# Patient Record
Sex: FEMALE | Race: WHITE | NOT HISPANIC OR LATINO | Employment: UNEMPLOYED | URBAN - METROPOLITAN AREA
[De-identification: names, ages, dates, MRNs, and addresses within clinical notes are randomized per-mention and may not be internally consistent; named-entity substitution may affect disease eponyms.]

---

## 2024-04-30 ENCOUNTER — TELEPHONE (OUTPATIENT)
Dept: PSYCHIATRY | Facility: CLINIC | Age: 24
End: 2024-04-30
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
## 2024-05-02 ENCOUNTER — TELEPHONE (OUTPATIENT)
Dept: PSYCHIATRY | Facility: CLINIC | Age: 24
End: 2024-05-02

## 2024-05-02 NOTE — TELEPHONE ENCOUNTER
Pt called asking if we could get her in sooner than her scheduled appt. The doc she was seeing was fully booked for the week so I told her we unfortunately couldn't see her sooner.

## 2024-05-03 NOTE — BH TREATMENT PLAN
TREATMENT PLAN (Medication Management Only)        [facility]    Name and Date of Birth:  Barbara Fletcher 23 y.o. 2000  Date of Treatment Plan: 05/08/2024  Diagnosis/Diagnoses:  No diagnosis found.  Strengths/Personal Resources for Self-Care: ability to adapt to life changes, ability to communicate needs, ability to listen, ability to reason, average or above intelligence.  Area/Areas of need (in own words): anxiety symptoms, depressive symptoms  1. Long Term Goal: decrease depression.  Target Date:6 months - 11/8/2024  Person/Persons responsible for completion of goal: Barbara  2.  Short Term Objective (s) - How will we reach this goal?:   A. Provider new recommended medication/dosage changes and/or continue medication(s):  initiate medication management .  B.  Attend follow up appointments  as scheduled .  C.  Take medications as prescribed  .  Target Date:6 months - 11/8/2024  Person/Persons Responsible for Completion of Goal: Barbara  Progress Towards Goals: initiating treatment  Treatment Modality: medication management every 4 weeks  Review due 180 days from date of this plan: 6 months - 11/8/2024  Expected length of service: ongoing treatment  My Physician/PA/NP and I have developed this plan together and I agree to work on the goals and objectives. I understand the treatment goals that were developed for my treatment.

## 2024-05-03 NOTE — BH CRISIS PLAN
Client Name: Barbara Fletchre       Client YOB: 2000    Caitlyn Safety Plan         Step 1: Warning Signs:            Step 2: Internal Coping Strategies:            Step 3: People and social settings that provide distraction:            Step 4: People whom I can ask for help during a crisis:      Step 5: Professionals or agencies I can contact during a crisis:        Crisis Phone Numbers:   Suicide Prevention Lifeline: Call or Text  287 Crisis Text Line: Text HOME to 000-078   Please note: Some [facility] do not have a separate number for Child/Adolescent specific crisis. If your county is not listed under Child/Adolescent, please call the adult number for your county      Adult Crisis Numbers: Child/Adolescent Crisis Numbers   [facility]: [phone] [facility]: [phone]   [facility]: [phone] [facility]: [phone]   [facility]: [phone] [place]: [phone]   [facility]: [phone] [place]: [phone]   [facility]: [phone]   [facility]: [phone]   [facility]: [phone]   [facility]: [phone] (daytime) [phone] (after hours, weekends, holidays)      Step 6: Making the environment safer (plan for lethal means safety):      Optional: What is most important to me and worth living for?      Caitlyn Safety Plan. Tracy Romeo and Renato Sidhu. Used with permission of the authors.

## 2024-05-08 ENCOUNTER — OFFICE VISIT (OUTPATIENT)
Dept: PSYCHIATRY | Facility: CLINIC | Age: 24
End: 2024-05-08
Payer: COMMERCIAL

## 2024-05-08 DIAGNOSIS — F41.1 GENERALIZED ANXIETY DISORDER: ICD-10-CM

## 2024-05-08 DIAGNOSIS — F39 MOOD DISORDER (HCC): ICD-10-CM

## 2024-05-08 DIAGNOSIS — F33.1 MODERATE EPISODE OF RECURRENT MAJOR DEPRESSIVE DISORDER (HCC): Primary | ICD-10-CM

## 2024-05-08 PROBLEM — F32.9 MDD (MAJOR DEPRESSIVE DISORDER): Status: ACTIVE | Noted: 2024-05-08

## 2024-05-08 PROCEDURE — 90792 PSYCH DIAG EVAL W/MED SRVCS: CPT

## 2024-05-08 RX ORDER — SERTRALINE HYDROCHLORIDE 25 MG/1
25 TABLET, FILM COATED ORAL DAILY
Qty: 30 TABLET | Refills: 0 | Status: SHIPPED | OUTPATIENT
Start: 2024-05-08

## 2024-05-08 RX ORDER — ONDANSETRON 4 MG/1
4 TABLET, FILM COATED ORAL EVERY 8 HOURS PRN
COMMUNITY

## 2024-05-08 RX ORDER — LORAZEPAM 0.5 MG/1
0.5 TABLET ORAL EVERY 8 HOURS PRN
COMMUNITY
End: 2024-05-08 | Stop reason: ALTCHOICE

## 2024-06-04 DIAGNOSIS — F33.1 MODERATE EPISODE OF RECURRENT MAJOR DEPRESSIVE DISORDER (HCC): ICD-10-CM

## 2024-06-04 DIAGNOSIS — F41.1 GENERALIZED ANXIETY DISORDER: ICD-10-CM

## 2024-06-04 RX ORDER — SERTRALINE HYDROCHLORIDE 25 MG/1
25 TABLET, FILM COATED ORAL DAILY
Qty: 30 TABLET | Refills: 0 | Status: SHIPPED | OUTPATIENT
Start: 2024-06-04

## 2024-06-05 ENCOUNTER — TELEPHONE (OUTPATIENT)
Dept: PSYCHIATRY | Facility: CLINIC | Age: 24
End: 2024-06-05

## 2024-06-05 NOTE — TELEPHONE ENCOUNTER
Patient is calling regarding cancelling an appointment.    Date/Time: 6/10/24    Reason: none given    Patient was rescheduled: YES [x] NO []  If yes, when was Patient reschedule for: 6/13/24
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
## 2024-06-13 ENCOUNTER — TELEMEDICINE (OUTPATIENT)
Dept: PSYCHIATRY | Facility: CLINIC | Age: 24
End: 2024-06-13
Payer: COMMERCIAL

## 2024-06-13 ENCOUNTER — TELEPHONE (OUTPATIENT)
Dept: PSYCHIATRY | Facility: CLINIC | Age: 24
End: 2024-06-13

## 2024-06-13 DIAGNOSIS — F41.1 GAD (GENERALIZED ANXIETY DISORDER): Primary | ICD-10-CM

## 2024-06-13 DIAGNOSIS — F32.9 MAJOR DEPRESSIVE DISORDER WITH CURRENT ACTIVE EPISODE, UNSPECIFIED DEPRESSION EPISODE SEVERITY, UNSPECIFIED WHETHER RECURRENT: ICD-10-CM

## 2024-06-13 PROBLEM — R19.7 DIARRHEA: Status: ACTIVE | Noted: 2024-06-13

## 2024-06-13 PROBLEM — K21.9 GASTROESOPHAGEAL REFLUX DISEASE: Status: ACTIVE | Noted: 2024-02-07

## 2024-06-13 PROBLEM — K58.1 IRRITABLE BOWEL SYNDROME WITH CONSTIPATION: Status: ACTIVE | Noted: 2024-02-07

## 2024-06-13 PROBLEM — F32.A DEPRESSION: Status: ACTIVE | Noted: 2024-05-08

## 2024-06-13 PROBLEM — F31.76: Status: ACTIVE | Noted: 2019-12-19

## 2024-06-13 PROBLEM — R00.0 SINUS TACHYCARDIA: Status: ACTIVE | Noted: 2024-05-14

## 2024-06-13 PROBLEM — R00.2 PALPITATIONS: Status: ACTIVE | Noted: 2023-03-20

## 2024-06-13 PROBLEM — Z71.9 ENCOUNTER FOR EDUCATION: Status: ACTIVE | Noted: 2024-06-13

## 2024-06-13 PROBLEM — R19.4 CHANGE IN BOWEL HABIT: Status: ACTIVE | Noted: 2024-06-13

## 2024-06-13 PROBLEM — K31.84 GASTROPARESIS: Status: ACTIVE | Noted: 2024-02-07

## 2024-06-13 PROBLEM — R10.13 EPIGASTRIC PAIN: Status: ACTIVE | Noted: 2024-06-13

## 2024-06-13 PROBLEM — U07.1 COVID-19: Status: ACTIVE | Noted: 2024-06-13

## 2024-06-13 PROBLEM — I31.39 PERICARDIAL EFFUSION: Status: ACTIVE | Noted: 2023-03-20

## 2024-06-13 PROBLEM — R94.31 ABNORMAL EKG: Status: ACTIVE | Noted: 2022-03-30

## 2024-06-13 PROBLEM — K01.1 IMPACTED TEETH: Status: ACTIVE | Noted: 2023-08-14

## 2024-06-13 PROBLEM — K90.0 CELIAC DISEASE: Status: ACTIVE | Noted: 2024-06-13

## 2024-06-13 PROBLEM — R14.0 BLOATING: Status: ACTIVE | Noted: 2024-06-13

## 2024-06-13 PROCEDURE — 99212 OFFICE O/P EST SF 10 MIN: CPT

## 2024-06-13 RX ORDER — ERYTHROMYCIN 250 MG/1
250 TABLET, DELAYED RELEASE ORAL
COMMUNITY
Start: 2024-05-24 | End: 2024-06-21

## 2024-06-13 RX ORDER — HYDROXYZINE HYDROCHLORIDE 25 MG/1
25 TABLET, FILM COATED ORAL EVERY 6 HOURS PRN
Qty: 30 TABLET | Refills: 0 | Status: SHIPPED | OUTPATIENT
Start: 2024-06-13

## 2024-06-13 RX ORDER — NORETHINDRONE ACETATE AND ETHINYL ESTRADIOL 1MG-20(21)
1 KIT ORAL DAILY
COMMUNITY
Start: 2023-12-22 | End: 2024-12-21

## 2024-06-13 RX ORDER — PANTOPRAZOLE SODIUM 40 MG/1
40 TABLET, DELAYED RELEASE ORAL DAILY
COMMUNITY

## 2024-06-13 NOTE — TELEPHONE ENCOUNTER
Writer called to schedule follow up appointment with Anna Benitez. Client scheduled for 7/11/24 at 10:30 am.

## 2024-07-09 ENCOUNTER — TELEPHONE (OUTPATIENT)
Dept: BEHAVIORAL/MENTAL HEALTH CLINIC | Facility: CLINIC | Age: 24
End: 2024-07-09

## 2024-07-09 NOTE — TELEPHONE ENCOUNTER
Contacted client regarding insurance not in network. Caller made client aware of self-pay option. Client would like to find a different provider within her insurance coverage and cancel appointment with Anna Benitez

## 2024-07-29 ENCOUNTER — TELEPHONE (OUTPATIENT)
Dept: PSYCHIATRY | Facility: CLINIC | Age: 24
End: 2024-07-29

## 2024-07-29 NOTE — TELEPHONE ENCOUNTER
Psychiatric Discharge Summary     Initial psych evaluation Date: 5/8/2024  Discharge Date: 7/29/2024    Discharge Diagnosis: ALYSHA, mood disorder,  unspecified     Treating Physician: CHEN Michael      Presenting Problems/Pertinent Findings: ALYSHA, mood disorder,  unspecified         Course of Treatment:  Patient was in treatment with this provider since 5/8/2024.        Subsequently, the patient withdrew from treatment.    Criteria for Discharge: Change in insurance    Aftercare Recommendations: Follow up with therapist, Follow up with pcp, and Follow up with psychiatrist    Discharge Medications:   Current Outpatient Medications:     hydrOXYzine HCL (ATARAX) 25 mg tablet, Take 1 tablet (25 mg total) by mouth every 6 (six) hours as needed for itching, Disp: 30 tablet, Rfl: 0    norethindrone-ethinyl estradiol (JUNEL FE 1/20) 1-20 MG-MCG per tablet, Take 1 tablet by mouth daily, Disp: , Rfl:     ondansetron (ZOFRAN) 4 mg tablet, Take 4 mg by mouth every 8 (eight) hours as needed for nausea or vomiting, Disp: , Rfl:     pantoprazole (PROTONIX) 40 mg tablet, Take 40 mg by mouth daily, Disp: , Rfl:     Potassium 99 MG TABS, , Disp: , Rfl:     potassium chloride (KCl) 2 mEq/mL SOLN, Take 99 mg by mouth daily, Disp: , Rfl:     sertraline (Zoloft) 50 mg tablet, Take 1 tablet (50 mg total) by mouth daily, Disp: 30 tablet, Rfl: 0       Mental Status at Time of most recent visit on 6/13/2024. Stable. Alert and oriented

## 2025-03-05 ENCOUNTER — DOCUMENTATION (OUTPATIENT)
Dept: PSYCHIATRY | Facility: CLINIC | Age: 25
End: 2025-03-05

## 2025-07-31 DIAGNOSIS — Z00.6 ENCOUNTER FOR EXAMINATION FOR NORMAL COMPARISON OR CONTROL IN CLINICAL RESEARCH PROGRAM: ICD-10-CM

## 2025-08-02 ENCOUNTER — APPOINTMENT (OUTPATIENT)
Dept: LAB | Facility: CLINIC | Age: 25
End: 2025-08-02

## 2025-08-02 DIAGNOSIS — Z00.6 ENCOUNTER FOR EXAMINATION FOR NORMAL COMPARISON OR CONTROL IN CLINICAL RESEARCH PROGRAM: ICD-10-CM

## 2025-08-02 PROCEDURE — 36415 COLL VENOUS BLD VENIPUNCTURE: CPT

## 2025-08-11 LAB
APOB+LDLR+PCSK9 GENE MUT ANL BLD/T: NOT DETECTED
BRCA1+BRCA2 DEL+DUP + FULL MUT ANL BLD/T: NOT DETECTED
MLH1+MSH2+MSH6+PMS2 GN DEL+DUP+FUL M: NOT DETECTED